# Patient Record
Sex: MALE | Race: ASIAN | NOT HISPANIC OR LATINO | URBAN - METROPOLITAN AREA
[De-identification: names, ages, dates, MRNs, and addresses within clinical notes are randomized per-mention and may not be internally consistent; named-entity substitution may affect disease eponyms.]

---

## 2021-12-06 ENCOUNTER — EMERGENCY (EMERGENCY)
Facility: HOSPITAL | Age: 36
LOS: 1 days | Discharge: HOME | End: 2021-12-06
Attending: EMERGENCY MEDICINE | Admitting: EMERGENCY MEDICINE
Payer: MEDICAID

## 2021-12-06 VITALS
TEMPERATURE: 98 F | RESPIRATION RATE: 16 BRPM | DIASTOLIC BLOOD PRESSURE: 80 MMHG | HEART RATE: 64 BPM | SYSTOLIC BLOOD PRESSURE: 122 MMHG | OXYGEN SATURATION: 98 %

## 2021-12-06 VITALS
RESPIRATION RATE: 16 BRPM | TEMPERATURE: 98 F | HEART RATE: 96 BPM | OXYGEN SATURATION: 100 % | DIASTOLIC BLOOD PRESSURE: 93 MMHG | SYSTOLIC BLOOD PRESSURE: 150 MMHG

## 2021-12-06 PROCEDURE — 73120 X-RAY EXAM OF HAND: CPT | Mod: 26,RT

## 2021-12-06 PROCEDURE — 99284 EMERGENCY DEPT VISIT MOD MDM: CPT

## 2021-12-06 PROCEDURE — 73610 X-RAY EXAM OF ANKLE: CPT | Mod: 26,RT

## 2021-12-06 RX ORDER — IBUPROFEN 200 MG
600 TABLET ORAL ONCE
Refills: 0 | Status: COMPLETED | OUTPATIENT
Start: 2021-12-06 | End: 2021-12-06

## 2021-12-06 RX ORDER — IBUPROFEN 200 MG
1 TABLET ORAL
Qty: 21 | Refills: 0
Start: 2021-12-06 | End: 2021-12-12

## 2021-12-06 RX ADMIN — Medication 600 MILLIGRAM(S): at 13:23

## 2021-12-06 NOTE — ED PROCEDURE NOTE - NS ED PERI NEURO NEG
Patient called in and stated that the steroid pack that she got from Jada yesterday for her pinched nerve is not working well for her and her  had a couple extra Vicodin from previous injury and she explained this worked much better. She was able to move around better after using this and its easier for her to get everyday activity done. She is wondering if Jada could prescribe her some of these so it can help her through until the pinched nerve works its way out. Routing to Jada for further review.    Pre-application: Motor, sensory, and vascular responses intact in the injured extremity./Post-application: Motor, sensory, and vascular responses intact in the injured extremity./The patient/caregiver verbalized understanding of how to care for the injured extremity with splint

## 2021-12-06 NOTE — ED PROVIDER NOTE - CLINICAL SUMMARY MEDICAL DECISION MAKING FREE TEXT BOX
The patient is a 36y Male who has no past medical and surgery history PTED for persistent right ankle and right hand pain since inversion injury sustained in fall on 11/27. Patient has had swelling over lat malleolus no fever chills redness no skin breakdown States he twisted it when falling but pain hasn't improved. Patient ambulatory in ED    Vital Signs Stable   PE: as described; my additions and exceptions are noted in the chart    IMPRESSION/RISK:  Dx=Chronic ankle sprain    Consideration include acute on chronic injury +/- occult fx  Plan  xrays  analgesics   reassess  Dispo as per results and response

## 2021-12-06 NOTE — ED PROVIDER NOTE - PATIENT PORTAL LINK FT
You can access the FollowMyHealth Patient Portal offered by [facility] by registering at the following website: [email or web address]. By joining [facility]’s FollowMyHealth portal, you will also be able to view your health information using other applications (apps) compatible with our system.

## 2021-12-06 NOTE — ED PROVIDER NOTE - MUSCULOSKELETAL MINIMAL EXAM
right malleolar tenderness swelling not warm or reddened right hand no pain on axial load/RANGE OF MOTION LIMITED

## 2021-12-06 NOTE — ED PROVIDER NOTE - OBJECTIVE STATEMENT
The patient is a 36y Male who has no past medical and surgery history PTED for persistent right ankle and right hand pain since inversion injury sustained in fall on 11/27. Patient has had swelling over lat malleolus no fever chills redness no skin breakdown States he twisted it when falling but pain hasn't improved. Patient ambulatory in ED

## 2021-12-06 NOTE — ED ADULT TRIAGE NOTE - CHIEF COMPLAINT QUOTE
Pt. c/o right ankle and right hand pain since fall on 11/27. States he twisted it when falling but pain hasn't improved. Ambulatory in triage.

## 2021-12-06 NOTE — ED PROVIDER NOTE - NSFOLLOWUPCLINICS_GEN_ALL_ED_FT
[facility]  Orthopedics  .  [place]   Phone: [phone]  Fax:   Follow Up Time: 4-6 Days

## 2021-12-06 NOTE — ED PROVIDER NOTE - NSFOLLOWUPINSTRUCTIONS_ED_ALL_ED_FT
We've got you covered from head to toe    Our specialty programs:    Spine Center  [phone]  [email or web address]    Exp[facility] Orthopaedic [facility]  [phone]  [email or web address]    Sports Program  [phone]  [email or web address]    Concussion Program  [phone]  [email or web address]     (Scheduling) team hours of operation:  Monday through Thursday, 7am to 8:30pm  Friday, 7am to 7pm  Saturday, 8am to 4pm    [facility]  Orthopedic Surgery  [address] [place] [address]  Phone: [phone]  Fax:   Follow Up Time:     [facility]  Orthopedics  [address] [place] [address]  Phone: [phone]  Fax: [phone]  Follow Up Time:     [facility]  Orthopedics  [address] [place] [address]      Ankle Sprain  WHAT YOU NEED TO KNOW:  An ankle sprain happens when 1 or more ligaments in your ankle joint stretch or tear. Ligaments are tough tissues that connect bones. Ligaments support your joints and keep your bones in place.  DISCHARGE INSTRUCTIONS:  Seek care immediately if:   •You have severe pain in your ankle.  •Your foot or toes are cold or numb.  •Your ankle becomes more weak or unstable (wobbly).  •You are unable to put any weight on your ankle or foot.  •Your swelling has increased or returned.  Call your doctor if:   •Your pain does not go away, even after treatment.  •You have questions or concerns about your condition or care.  Medicines: You may need any of the following:   •NSAIDs, such as ibuprofen, help decrease swelling, pain, and fever. This medicine is available with or without a doctor's order. NSAIDs can cause stomach bleeding or kidney problems in certain people. If you take blood thinner medicine, always ask your healthcare provider if NSAIDs are safe for you. Always read the medicine label and follow directions.  •Acetaminophen decreases pain and fever. It is available without a doctor's order. Ask how much to take and how often to take it. Follow directions. Read the labels of all other medicines you are using to see if they also contain acetaminophen, or ask your doctor or pharmacist. Acetaminophen can cause liver damage if not taken correctly. Do not use more than 4 grams (4,000 milligrams) total of acetaminophen in one day.   •Prescription pain medicine may be given. Ask your healthcare provider how to take this medicine safely. Some prescription pain medicines contain acetaminophen. Do not take other medicines that contain acetaminophen without talking to your healthcare provider. Too much acetaminophen may cause liver damage. Prescription pain medicine may cause constipation. Ask your healthcare provider how to prevent or treat constipation.   •Take your medicine as directed. Contact your healthcare provider if you think your medicine is not helping or if you have side effects. Tell him or her if you are allergic to any medicine. Keep a list of the medicines, vitamins, and herbs you take. Include the amounts, and when and why you take them. Bring the list or the pill bottles to follow-up visits. Carry your medicine list with you in case of an emergency.  Self-care:   •Use support devices, such as a brace, cast, or splint, to limit your movement and protect your joint. You may need to use crutches to decrease your pain as you move around.  •Go to physical therapy as directed. A physical therapist teaches you exercises to help improve movement and strength, and to decrease pain.  •Rest your ankle so that it can heal. Return to normal activities as directed.  •Apply ice on your ankle for 15 to 20 minutes every hour or as directed. Use an ice pack, or put crushed ice in a plastic bag. Cover it with a towel. Ice helps prevent tissue damage and decreases swelling and pain.  •Compress your ankle. Ask if you should wrap an elastic bandage around your injured ligament. An elastic bandage provides support and helps decrease swelling and movement so your joint can heal. Wear as long as directed.  •Elevate your ankle above the level of your heart as often as you can. This will help decrease swelling and pain. Prop your ankle on pillows or blankets to keep it elevated comfortably.   Elevate Leg  Prevent another ankle sprain:   •Let your ankle heal. Find out how long your ligament needs to heal. Do not do any physical activity until your healthcare provider says it is okay. If you start activity too soon, you may develop a more serious injury.  •Always warm up and stretch before you exercise or play sports.  •Use the right equipment. Always wear shoes that fit well and are made for the activity that you are doing. You may also need ankle supports, elbow and knee pads, or braces.  Follow up with your doctor as directed: Write down your questions so you remember to ask them during your visits.

## 2021-12-10 ENCOUNTER — NON-APPOINTMENT (OUTPATIENT)
Age: 36
End: 2021-12-10

## 2021-12-10 PROBLEM — Z00.00 ENCOUNTER FOR PREVENTIVE HEALTH EXAMINATION: Status: ACTIVE | Noted: 2021-12-10

## 2021-12-10 PROBLEM — Z78.9 OTHER SPECIFIED HEALTH STATUS: Chronic | Status: ACTIVE | Noted: 2021-12-06

## 2021-12-13 ENCOUNTER — TRANSCRIPTION ENCOUNTER (OUTPATIENT)
Age: 36
End: 2021-12-13

## 2021-12-13 ENCOUNTER — APPOINTMENT (OUTPATIENT)
Dept: ORTHOPEDIC SURGERY | Facility: CLINIC | Age: 36
End: 2021-12-13
Payer: MEDICAID

## 2021-12-13 VITALS
DIASTOLIC BLOOD PRESSURE: 82 MMHG | WEIGHT: 135 LBS | BODY MASS INDEX: 19.99 KG/M2 | SYSTOLIC BLOOD PRESSURE: 138 MMHG | HEIGHT: 69 IN | HEART RATE: 80 BPM

## 2021-12-13 DIAGNOSIS — Z78.9 OTHER SPECIFIED HEALTH STATUS: ICD-10-CM

## 2021-12-13 DIAGNOSIS — Z82.49 FAMILY HISTORY OF ISCHEMIC HEART DISEASE AND OTHER DISEASES OF THE CIRCULATORY SYSTEM: ICD-10-CM

## 2021-12-13 DIAGNOSIS — S99.911A UNSPECIFIED INJURY OF RIGHT ANKLE, INITIAL ENCOUNTER: ICD-10-CM

## 2021-12-13 DIAGNOSIS — Z82.3 FAMILY HISTORY OF STROKE: ICD-10-CM

## 2021-12-13 DIAGNOSIS — Z83.438 FAMILY HISTORY OF OTHER DISORDER OF LIPOPROTEIN METABOLISM AND OTHER LIPIDEMIA: ICD-10-CM

## 2021-12-13 DIAGNOSIS — S69.91XA UNSPECIFIED INJURY OF RIGHT WRIST, HAND AND FINGER(S), INITIAL ENCOUNTER: ICD-10-CM

## 2021-12-13 PROCEDURE — 99072 ADDL SUPL MATRL&STAF TM PHE: CPT

## 2021-12-13 PROCEDURE — 99203 OFFICE O/P NEW LOW 30 MIN: CPT

## 2021-12-13 RX ORDER — DIPHENHYDRAMINE HCL 25 MG/1
25 TABLET ORAL
Refills: 0 | Status: ACTIVE | COMMUNITY

## 2021-12-13 RX ORDER — MONTELUKAST 10 MG/1
10 TABLET, FILM COATED ORAL
Refills: 0 | Status: ACTIVE | COMMUNITY

## 2021-12-13 RX ORDER — FLUTICASONE PROPIONATE 50 UG/1
50 SPRAY, METERED NASAL
Refills: 0 | Status: ACTIVE | COMMUNITY

## 2021-12-13 NOTE — PHYSICAL EXAM
[LE] : Sensory: Intact in bilateral lower extremities [DP] : dorsalis pedis 2+ and symmetric bilaterally [PT] : posterior tibial 2+ and symmetric bilaterally [Rad] : radial 2+ and symmetric bilaterally [Normal] : Alert and in no acute distress [Poor Appearance] : well-appearing [Acute Distress] : not in acute distress [de-identified] : The patient has no respiratory distress. Mood and affect are normal. The patient is alert and oriented to person, place and time.\par Examination of the cervical spine demonstrates no tenderness, no deformity and no muscle spasm. Cervical spine rotation is 60° to the right, 60° to the left, 75° of extension and 45° of flexion. Neurologic exam of the upper extremities reveals intact sensation to light touch. Motor function is 5 over 5 in all groups. Deep tendon reflexes are 2+ and equal at the biceps, triceps and brachioradialis.\par Examination of the right shoulder demonstrates no swelling, no deformity and no tenderness. The shoulder is stable. Drop arm test is negative. [place] test is negative. Liftoff test is negative. Motor strength is 5 over 5 in all groups. Range of motion is full and identical to that of the left shoulder. Flexion is 160°, abduction 160°, external rotation 45° and internal rotation to the lower thoracic level.\par Examination of the right wrist and hand demonstrates tenderness at the distal fourth and fifth metacarpals.  Tendon function is intact.  There is no pain with wrist range of motion.  Tinel signs are negative.  Durkan's test is negative.  The skin is intact.  Capillary refill is brisk.\par There is no pain with motion of the hips or knees.  Both calves are soft and nontender.  Both Achilles tendons are intact.  There is minimal tenderness of the ATFL of the right ankle.  There is no ankle instability.  He walks without a limp.  The skin is intact.  There is no lymphedema. [de-identified] : EXAM: XR ANKLE COMP MIN 3 VIEWS RT\par \par PROCEDURE DATE: Dec 6 2021\par \par INTERPRETATION: CLINICAL INDICATION: fall; right ankle pain\par \par EXAM:\par Frontal, oblique, and lateral right ankle from 12/6/2021 at 1342. No similar prior studies available for comparison.\par \par IMPRESSION:\par Soft tissue swelling and ankle joint effusion.\par \par No fractures or dislocations.\par \par Congruent ankle mortise with smooth and intact talar dome.\par \par Preserved remaining visualized joint spaces. No joint margin erosions.\par \par No calcaneal spurring and unremarkable appearing Achilles tendon shadow.\par \par No lytic or blastic lesions.\par \par EMEKA FIORE MD; Attending Radiologist\par This document has been electronically signed. Dec 6 2021 3:42PM\par \par \par \par \par EXAM: XR HAND 2 VIEWS RT\par \par PROCEDURE DATE: Dec 6 2021\par \par INTERPRETATION: CLINICAL INDICATION: fall; right hand pain\par \par EXAM:\par Frontal, oblique, and lateral right hand from 12/6/2021 at 1342. No similar prior studies available for comparison.\par \par IMPRESSION:\par No fractures or dislocations.\par \par Preserved joint spaces and no joint margin erosions.\par \par Carpal bones normally aligned.\par \par Neutral ulnar variance.\par \par No lytic or blastic lesions.\par \par EMEKA FIORE MD; Attending Radiologist\par This document has been electronically signed. Dec 6 2021 3:43PM

## 2021-12-13 NOTE — DISCUSSION/SUMMARY
[de-identified] : The patient has sustained a contusion to his right hand and sprain to his right ankle.  I have discussed the pathology, natural history and treatment options with him.  He will wean himself from the Aircast.  He will work on exercises for his hand and for his lower extremities.  He may take ibuprofen if he has pain although he is not having much pain at this time.  If symptomatic in 4 weeks he will return.

## 2021-12-13 NOTE — HISTORY OF PRESENT ILLNESS
[de-identified] : I saw Adonay this morning.  This 36-year-old right-handed male presents for evaluation of injuries to his right ankle and right hand.  On November 27, 2021 he fell.  He describes an inversion injury to his right ankle.  He landed on his right hand.  He complains of mild pain on the outer aspect of his ankle and on his right hand towards the ulnar aspect.  He denies numbness or tingling in hands or feet.  There is no prior history of right hand or right ankle pain or injury.\par He was seen in the [facility] ER where he was given an Aircast and started on ibuprofen for pain.

## 2022-07-05 NOTE — ED PROVIDER NOTE - CROS ED ENMT ALL NEG
Moxifloxacin (By mouth)   Moxifloxacin ([id number])  Treats infections and plague (including pneumonic and septicemic plague)  This medicine is a quinolone antibiotic  Brand Name(s): Avelox   There may be other brand names for this medicine  When This Medicine Should Not Be Used: This medicine is not right for everyone  Do not use it if you had an allergic reaction to moxifloxacin or to similar medicines  How to Use This Medicine:   Tablet  Your doctor will tell you how much medicine to use  Do not use more than directed  Take this medicine at the same time each day  Swallow the tablet whole with a glass of water  Do not split, crush, or chew it  Drink plenty of fluids while you are being treated with this medicine  Take all of the medicine in your prescription to clear up your infection, even if you feel better after the first few doses  This medicine should come with a Medication Guide  Ask your pharmacist for a copy if you do not have one  Missed dose: If you miss a dose and it is 8 hours or more until your next dose, take the missed dose as soon as possible, and then go back to your regular schedule  If you miss a dose and it is less than 8 hours until your next dose, skip the missed dose and go back to your regular dosing schedule  Do not take 2 doses of this medicine to make up for a missed dose  Store the medicine in a closed container at room temperature, away from heat, moisture, and direct light  Drugs and Foods to Avoid:   Ask your doctor or pharmacist before using any other medicine, including over-the-counter medicines, vitamins, and herbal products  Some medicines can affect how moxifloxacin works   Tell your doctor if you are using any of the following:  Cisapride, erythromycin  Blood thinner (including warfarin)  Insulin or oral diabetes medicines  Medicine for depression or mental illness (including TCAs)  Medicine for heart rhythm problems (including amiodarone, procainamide, quinidine, sotalol)  NSAIDs (including diclofenac, ibuprofen)  Steroid medicine  Take moxifloxacin at least 4 hours before or 8 hours after you take didanosine buffered tablets for oral suspension or the pediatric powder for oral suspension, sucralfate, or antacids, multivitamins, or other products containing aluminum, magnesium, iron, or zinc   Warnings While Using This Medicine:   Tell your doctor if you are pregnant or breastfeeding, or if you have kidney disease, liver disease, diabetes, heart disease, myasthenia gravis, brain problems, aortic aneurysm (bulge in the wall of the largest artery), or a history of heart rhythm problems (including prolonged QT interval), seizures, or mental illness  Tell your doctor if you have ever had tendon or joint problems, including rheumatoid arthritis, or if you have received a transplant  This medicine may cause the following problems:  Tendinitis and tendon rupture (may happen after treatment ends)  Nerve damage in the arms or legs, which may become permanent  Changes in mood or behavior, seizures, or increased pressure in the head  Serious skin reactions  Kidney problems  Liver problems  Increased risk of aortic aneurysm  Heart rhythm changes  Changes in blood sugar levels  This medicine may make you dizzy or lightheaded  Do not drive or do anything else that could be dangerous until you know how this medicine affects you  This medicine may make you bleed, bruise, or get infections more easily  Take precautions to prevent illness and injury  Wash your hands often  This medicine can cause diarrhea  Call your doctor if the diarrhea becomes severe, does not stop, or is bloody  Do not take any medicine to stop diarrhea until you have talked to your doctor  Diarrhea can occur 2 months or more after you stop taking this medicine  This medicine may make your skin more sensitive to sunlight  Wear sunscreen  Do not use sunlamps or tanning beds    Call your doctor if your symptoms do not improve or if they get worse  Your doctor will do lab tests at regular visits to check on the effects of this medicine  Keep all appointments  Keep all medicine out of the reach of children  Never share your medicine with anyone  Possible Side Effects While Using This Medicine:   Call your doctor right away if you notice any of these side effects: Allergic reaction: Itching or hives, swelling in your face or hands, swelling or tingling in your mouth or throat, chest tightness, trouble breathing  Blistering, peeling, red skin rash  Change in how much or how often you urinate, cloudy or bloody urine  Chest pain, fast, slow, or uneven heartbeat  Dark urine or pale stools, nausea, vomiting, loss of appetite, stomach pain, yellow skin or eyes  Diarrhea that may contain blood  Fainting, dizziness, or lightheadedness  Numbness, tingling, weakness, or burning pain in your hands, arms, legs, or feet  Pain, stiffness, swelling, or bruises around your ankle, leg, shoulder, or other joints  Seizures, severe headache, unusual thoughts or behaviors, trouble sleeping, feeling anxious, confused, or depressed, seeing, hearing, or feeling things that are not there  Sensitivity of the skin to sunlight, redness or other discoloration of the skin, severe sunburn  Shaking, trembling, sweating, hunger, confusion  Sudden chest, stomach, or back pain, trouble breathing, cough  Unusual bleeding, bruising, or weakness  If you notice these less serious side effects, talk with your doctor:   Mild diarrhea or nausea  If you notice other side effects that you think are caused by this medicine, tell your doctor  Call your doctor for medical advice about side effects  You may report side effects to FDA at 8-307-FDA-5479    © Copyright [employer] 2022 Information is for End User's use only and may not be sold, redistributed or otherwise used for commercial purposes  The above information is an  only   It is not intended as medical advice for individual conditions or treatments  Talk to your doctor, nurse or pharmacist before following any medical regimen to see if it is safe and effective for you  Conjunctivitis   AMBULATORY CARE:   Conjunctivitis,  or pink eye, is inflammation of your conjunctiva  The conjunctiva is a thin tissue that covers the front of your eye and the back of your eyelids  The conjunctiva helps protect your eye and keep it moist  Conjunctivitis may be caused by bacteria, allergies, or a virus  If your conjunctivitis is caused by bacteria, it may get better on its own in about 7 days  Viral conjunctivitis can last up to 3 weeks  Common symptoms may include any of the following: You will usually have symptoms in both eyes if your conjunctivitis is caused by allergies  You may also have other allergic symptoms, such as a rash or runny nose  Symptoms will usually start in 1 eye if your conjunctivitis is caused by a virus or bacteria  Redness in the whites of your eye    Itching in your eye or around your eye    Feeling like there is something in your eye    Watery or thick, sticky discharge    Crusty eyelids when you wake up in the morning    Burning, stinging, or swelling in your eye    Pain when you see bright light    Seek care immediately if:   You have worsening eye pain  The swelling in your eye gets worse, even after treatment  Your vision suddenly becomes worse or you cannot see at all  Contact your healthcare provider if:   You develop a fever and ear pain  You have tiny bumps or spots of blood on your eye  You have questions or concerns about your condition or care  Treatment  will depend on the cause of your conjunctivitis  You may need antibiotics or allergy medicine as a pill, eye drop, or eye ointment  Manage your symptoms:   Apply a cool compress  Wet a washcloth with cold water and place it on your eye  This will help decrease itching and irritation      Do not wear contact lenses  They can irritate your eye  Throw away the pair you are using and ask when you can wear them again  Use a new pair of lenses when your healthcare provider says it is okay  Avoid irritants  Stay away from smoke filled areas  Shield your eyes from wind and sun  Flush your eye  You may need to flush your eye with saline to help decrease your symptoms  Ask for more information on how to flush your eye  Medicines:  Treatment depends on what is causing your conjunctivitis  You may be given any of the following: Allergy medicine  helps decrease itchy, red, swollen eyes caused by allergies  It may be given as a pill, eye drops, or nasal spray  Antibiotics  may be needed if your conjunctivitis is caused by bacteria  This medicine may be given as a pill, eye drops, or eye ointment  Take your medicine as directed  Contact your healthcare provider if you think your medicine is not helping or if you have side effects  Tell him or her if you are allergic to any medicine  Keep a list of the medicines, vitamins, and herbs you take  Include the amounts, and when and why you take them  Bring the list or the pill bottles to follow-up visits  Carry your medicine list with you in case of an emergency  Prevent the spread of conjunctivitis:   Wash your hands with soap and water often  Wash your hands before and after you touch your eyes  Also wash your hands before you prepare or eat food and after you use the bathroom or change a diaper  Avoid allergens  Try to avoid the things that cause your allergies, such as pets, dust, or grass  Avoid contact with others  Do not share towels or washcloths  Try to stay away from others as much as possible  Ask when you can return to work or school  Throw away eye makeup  The bacteria that caused your conjunctivitis can stay in eye makeup  Throw away mascara and other eye makeup      © Copyright [address] 2022 Information is for End User's use only and may not be sold, redistributed or otherwise used for commercial purposes  All illustrations and images included in CareNotes® are the copyrighted property of [employer]  or Brennan [employer]  The above information is an  only  It is not intended as medical advice for individual conditions or treatments  Talk to your doctor, nurse or pharmacist before following any medical regimen to see if it is safe and effective for you  negative...